# Patient Record
Sex: MALE | URBAN - METROPOLITAN AREA
[De-identification: names, ages, dates, MRNs, and addresses within clinical notes are randomized per-mention and may not be internally consistent; named-entity substitution may affect disease eponyms.]

---

## 2023-06-21 ENCOUNTER — OFFICE VISIT (OUTPATIENT)
Dept: URBAN - METROPOLITAN AREA CLINIC 21 | Facility: CLINIC | Age: 53
End: 2023-06-21

## 2023-06-21 DIAGNOSIS — H11.031 DOUBLE PTERYGIUM OF RIGHT EYE: Primary | ICD-10-CM

## 2023-06-21 DIAGNOSIS — H04.123 TEAR FILM INSUFFICIENCY OF BILATERAL LACRIMAL GLANDS: ICD-10-CM

## 2023-06-21 DIAGNOSIS — H11.012 AMYLOID PTERYGIUM OF LEFT EYE: ICD-10-CM

## 2023-06-21 PROCEDURE — 99203 OFFICE O/P NEW LOW 30 MIN: CPT

## 2023-06-21 ASSESSMENT — INTRAOCULAR PRESSURE
OD: 15
OS: 15

## 2023-06-21 NOTE — IMPRESSION/PLAN
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 Plan: Pt educated on findings. Pt reports significant irritation and vision changes. Referral sent to Dr. Mira Altman for surgical removal. 
Patient recommended to use artificial tears for irritation and Lumify for redness.

## 2023-09-08 ENCOUNTER — OFFICE VISIT (OUTPATIENT)
Dept: URBAN - METROPOLITAN AREA CLINIC 23 | Facility: CLINIC | Age: 53
End: 2023-09-08

## 2023-09-08 DIAGNOSIS — H11.033 DOUBLE PTERYGIUM OF EYE, BILATERAL: Primary | ICD-10-CM

## 2023-09-08 PROCEDURE — 99203 OFFICE O/P NEW LOW 30 MIN: CPT | Performed by: OPTOMETRIST

## 2023-09-08 RX ORDER — PREDNISOLONE ACETATE 10 MG/ML
1 % SUSPENSION/ DROPS OPHTHALMIC
Qty: 5 | Refills: 0 | Status: ACTIVE
Start: 2023-09-08

## 2023-09-08 ASSESSMENT — INTRAOCULAR PRESSURE
OS: 16
OD: 16

## 2023-11-10 ENCOUNTER — OFFICE VISIT (OUTPATIENT)
Dept: URBAN - METROPOLITAN AREA CLINIC 23 | Facility: CLINIC | Age: 53
End: 2023-11-10

## 2023-11-10 DIAGNOSIS — H11.033 DOUBLE PTERYGIUM OF EYE, BILATERAL: Primary | ICD-10-CM

## 2023-11-10 PROCEDURE — 99212 OFFICE O/P EST SF 10 MIN: CPT | Performed by: OPTOMETRIST

## 2023-11-10 RX ORDER — PREDNISOLONE ACETATE 10 MG/ML
1 % SUSPENSION/ DROPS OPHTHALMIC
Qty: 5 | Refills: 0 | Status: ACTIVE
Start: 2023-11-10

## 2023-12-14 ENCOUNTER — OFFICE VISIT (OUTPATIENT)
Dept: URBAN - METROPOLITAN AREA CLINIC 23 | Facility: CLINIC | Age: 53
End: 2023-12-14

## 2023-12-14 DIAGNOSIS — H11.013 AMYLOID PTERYGIUM, BILATERAL: Primary | ICD-10-CM

## 2023-12-14 PROCEDURE — 99203 OFFICE O/P NEW LOW 30 MIN: CPT | Performed by: OPHTHALMOLOGY

## 2023-12-14 RX ORDER — OFLOXACIN 3 MG/ML
0.3 % SOLUTION/ DROPS OPHTHALMIC
Qty: 5 | Refills: 1 | Status: ACTIVE
Start: 2023-12-14

## 2023-12-14 RX ORDER — PREDNISOLONE ACETATE 10 MG/ML
1 % SUSPENSION/ DROPS OPHTHALMIC
Qty: 5 | Refills: 1 | Status: ACTIVE
Start: 2023-12-14

## 2023-12-14 ASSESSMENT — KERATOMETRY
OD: 43.25
OS: 43.00

## 2023-12-14 ASSESSMENT — INTRAOCULAR PRESSURE
OD: 20
OS: 20